# Patient Record
Sex: MALE | Race: WHITE | URBAN - METROPOLITAN AREA
[De-identification: names, ages, dates, MRNs, and addresses within clinical notes are randomized per-mention and may not be internally consistent; named-entity substitution may affect disease eponyms.]

---

## 2017-03-06 ENCOUNTER — OFFICE VISIT (OUTPATIENT)
Dept: DERMATOLOGY | Age: 67
End: 2017-03-06

## 2017-03-06 DIAGNOSIS — L57.0 AK (ACTINIC KERATOSIS): ICD-10-CM

## 2017-03-06 DIAGNOSIS — L82.1 SK (SEBORRHEIC KERATOSIS): Primary | ICD-10-CM

## 2017-03-06 PROCEDURE — 99213 OFFICE O/P EST LOW 20 MIN: CPT | Performed by: DERMATOLOGY

## 2017-10-31 ENCOUNTER — OFFICE VISIT (OUTPATIENT)
Dept: DERMATOLOGY | Age: 67
End: 2017-10-31

## 2017-10-31 DIAGNOSIS — L57.0 AK (ACTINIC KERATOSIS): Primary | ICD-10-CM

## 2017-10-31 DIAGNOSIS — L82.1 SK (SEBORRHEIC KERATOSIS): ICD-10-CM

## 2017-10-31 PROCEDURE — 99213 OFFICE O/P EST LOW 20 MIN: CPT | Performed by: DERMATOLOGY

## 2017-10-31 RX ORDER — FLUOROURACIL 50 MG/G
CREAM TOPICAL
Qty: 40 G | Refills: 0 | Status: SHIPPED | OUTPATIENT
Start: 2017-10-31 | End: 2020-11-05

## 2017-10-31 NOTE — PATIENT INSTRUCTIONS
Fluorouracil (Efudex / Carac)     Your physician has prescribed a topical medication that is used to treat pre-cancerous skin lesions and certain types of skin cancers. We are providing you with the following information so that you understand how the medication should be used and potential side effects you may experience.  Clean and dry the areas of the skin that will be treated.  Apply a small amount of the medication to your fingertip. Dab the medication onto the designated areas and rub it in.  Discontinue the medication once the skin starts to scab. Typically this takes between 2 and 4 weeks after starting the medication.  Avoid applying the medication in or around the eyes or eyelids. If the medication gets into your eyes, nose or mouth, rinse immediately.  Wash your hands immediately after application.  Side effects include: burning, redness, irritation, dryness, pain, swelling and changes in skin color. Vaseline and/or cool compresses may be applied to affected areas for comfort.  Please note that you may be more sensitive to the sun. Use sunscreen and wear protective clothing when outdoors. Avoid prolonged sun exposure. · Once you have discontinued the medication, apply vaseline several times daily until the skin has healed.

## 2020-11-05 ENCOUNTER — OFFICE VISIT (OUTPATIENT)
Dept: DERMATOLOGY | Age: 70
End: 2020-11-05
Payer: MEDICARE

## 2020-11-05 VITALS — TEMPERATURE: 97.4 F

## 2020-11-05 PROCEDURE — 99213 OFFICE O/P EST LOW 20 MIN: CPT | Performed by: DERMATOLOGY

## 2020-11-05 PROCEDURE — 17003 DESTRUCT PREMALG LES 2-14: CPT | Performed by: DERMATOLOGY

## 2020-11-05 PROCEDURE — 17000 DESTRUCT PREMALG LESION: CPT | Performed by: DERMATOLOGY

## 2020-11-05 PROCEDURE — 11200 RMVL SKIN TAGS UP TO&INC 15: CPT | Performed by: DERMATOLOGY

## 2020-11-05 RX ORDER — TRIAMCINOLONE ACETONIDE 1 MG/G
CREAM TOPICAL
Qty: 30 G | Refills: 1 | Status: SHIPPED | OUTPATIENT
Start: 2020-11-05

## 2020-11-05 NOTE — PROGRESS NOTES
[facility] Dermatology  Maria Esther Luong MD  [phone]      Alexander Peralta  1950    69 y.o. male     Date of Visit: 11/5/2020    Chief Complaint: skin lesions    History of Present Illness:    1. He complains of a couple of scaly lesions on the nose and left ear. 2.  He also complains of some recurrently irritated lesions on the left lower neck. 3.  He complains of a pruritic eruption behind the right knee. 4.  He also complains of a dark lesion on the left chest.    He has a history of multiple actinic keratoses of the forehead, temples and nose. Prescribed Efudex in the past.      Review of Systems:  Skin: No other concerning lesions or growths. Past Medical History, Family History, Surgical History, Medications and Allergies reviewed. Past Medical History:   Diagnosis Date    Hyperlipidemia     Hypertension      Past Surgical History:   Procedure Laterality Date    EYE SURGERY      detached retina       Allergies   Allergen Reactions    Codeine Nausea Only    Sulfur Hives     Outpatient Medications Marked as Taking for the 11/5/20 encounter (Office Visit) with Zeinab Simms MD   Medication Sig Dispense Refill    triamcinolone (KENALOG) 0.1 % cream Apply to affected area behind the right knee twice daily for up to 2 weeks or until improved. 30 g 1    atorvastatin (LIPITOR) 80 MG tablet Take 80 mg by mouth      Fexofenadine HCl (ALLEGRA PO) Take by mouth      aspirin 81 MG tablet Take 81 mg by mouth daily.  ramipril (ALTACE) 10 MG capsule Take 10 mg by mouth daily.  hydrochlorothiazide (HYDRODIURIL) 25 MG tablet Take 25 mg by mouth daily. Physical Examination       The following were examined and determined to be normal: Psych/Neuro, Scalp/hair, Conjunctivae/eyelids, Gums/teeth/lips, Breast/axilla/chest, Abdomen, Back, RUE, LUE, LLE and Nails/digits.     The following were examined and determined to be abnormal: Head/face, Breast/axilla/chest and RLE. Well-appearing. 1.  Left lower helix-1, right proximal nasal sidewall-1: 2 keratotic erythematous macules. 2.  Left lower lateral neck with 2 pedunculated brown papules. 3.  Right popliteal fossa with an ill-defined crusted and scaly erythematous patch. 4.  Left lower breast with a stuck on appearing waxy brown papule. Assessment and Plan     1. AK (actinic keratosis) -     2 cycles of liquid nitrogen applied to 2 AKs: left ear and nose. Patient was educated regarding the potential risks of blister formation, discomfort. Wound care was discussed. 2. Cutaneous skin tags - recurrently irritated due to location    2 cycles of liquid nitrogen applied to 2 skin tags on the left lower neck. Patient was educated regarding the potential risks of blister formation, discomfort. Wound care was discussed. 3. Irritant dermatitis behind the right knee    Triamcinolone 0.1% cream twice daily for up to 2 weeks or until improved. 4. SK (seborrheic keratosis)     Reassurance. Return in about 1 year (around 11/5/2021).     --Nai Nance MD

## 2021-11-08 ENCOUNTER — OFFICE VISIT (OUTPATIENT)
Dept: DERMATOLOGY | Age: 71
End: 2021-11-08
Payer: MEDICARE

## 2021-11-08 VITALS — TEMPERATURE: 98.1 F

## 2021-11-08 DIAGNOSIS — L82.0 INFLAMED SEBORRHEIC KERATOSIS: ICD-10-CM

## 2021-11-08 DIAGNOSIS — L57.0 ACTINIC KERATOSIS: Primary | ICD-10-CM

## 2021-11-08 PROCEDURE — 17000 DESTRUCT PREMALG LESION: CPT | Performed by: DERMATOLOGY

## 2021-11-08 PROCEDURE — 17003 DESTRUCT PREMALG LES 2-14: CPT | Performed by: DERMATOLOGY

## 2021-11-08 PROCEDURE — 17110 DESTRUCTION B9 LES UP TO 14: CPT | Performed by: DERMATOLOGY

## 2021-11-08 NOTE — PROGRESS NOTES
[facility] Dermatology  [facility] Johnna Jo  1950    66 y.o. male     Date of Visit: 11/8/2021    Chief Complaint: skin lesions    History of Present Illness:    1. Follow up for a hx of AKs - has several new lesions on forehead and upper extremities. 2.  He also has an irritated lesion on the central upper back. Derm Hx:  He has a history of facial actinic keratoses that were treated with Efudex in the past.      Review of Systems:  Gen: Feels well, good sense of health. Past Medical History, Family History, Surgical History, Medications and Allergies reviewed. Past Medical History:   Diagnosis Date    Hyperlipidemia     Hypertension      Past Surgical History:   Procedure Laterality Date    EYE SURGERY      detached retina       Allergies   Allergen Reactions    Codeine Nausea Only    Sulfur Hives     Outpatient Medications Marked as Taking for the 11/8/21 encounter (Office Visit) with Jg Cronin MD   Medication Sig Dispense Refill    Nutritional Supplements (VITAMIN D BOOSTER PO) Take by mouth      triamcinolone (KENALOG) 0.1 % cream Apply to affected area behind the right knee twice daily for up to 2 weeks or until improved. 30 g 1    atorvastatin (LIPITOR) 80 MG tablet Take 80 mg by mouth      Fexofenadine HCl (ALLEGRA PO) Take by mouth      aspirin 81 MG tablet Take 81 mg by mouth daily.  ramipril (ALTACE) 10 MG capsule Take 10 mg by mouth daily.  hydrochlorothiazide (HYDRODIURIL) 25 MG tablet Take 25 mg by mouth daily. Social History:  Occupation:        Physical Examination       The following were examined and determined to be normal: Psych/Neuro, Scalp/hair, Conjunctivae/eyelids, Gums/teeth/lips, Neck, Breast/axilla/chest, Abdomen, RLE, LLE and Nails/digits. The following were examined and determined to be abnormal: Head/face, Back, RUE and LUE. Well appearing.     1.  Right hand - 2, right forearm - 2, left lower arm - 1, left forearm - 4, right lateral forehead - 2: ill defined keratotic pink macules. 2.  Central upper back - stuck on appearing round verrucous pink plaque with focal crusting. Assessment and Plan     1. Actinic keratoses - several    2 cycles of liquid nitrogen applied to 11 AKs: Right hand - 2, right forearm - 2, left lower arm - 1, left forearm - 4, right lateral forehead - 2. Patient was educated regarding the potential risks of blister formation and discomfort. Wound care was discussed. 2. Inflamed seborrheic keratosis - 1    2 cycles of liquid nitrogen applied to 1 ISK on the central upper back. Patient was educated regarding the potential risks of blister formation and discomfort. Wound care was discussed. Return in about 1 year (around 11/8/2022).     --Maria Del Caremn Jaimes MD

## 2022-11-09 ENCOUNTER — OFFICE VISIT (OUTPATIENT)
Dept: DERMATOLOGY | Age: 72
End: 2022-11-09
Payer: MEDICARE

## 2022-11-09 DIAGNOSIS — L82.1 SK (SEBORRHEIC KERATOSIS): ICD-10-CM

## 2022-11-09 DIAGNOSIS — L57.0 ACTINIC KERATOSIS: Primary | ICD-10-CM

## 2022-11-09 DIAGNOSIS — L81.4 SOLAR LENTIGINOSIS: ICD-10-CM

## 2022-11-09 PROCEDURE — 17003 DESTRUCT PREMALG LES 2-14: CPT | Performed by: DERMATOLOGY

## 2022-11-09 PROCEDURE — 1123F ACP DISCUSS/DSCN MKR DOCD: CPT | Performed by: DERMATOLOGY

## 2022-11-09 PROCEDURE — 17000 DESTRUCT PREMALG LESION: CPT | Performed by: DERMATOLOGY

## 2022-11-09 PROCEDURE — 99213 OFFICE O/P EST LOW 20 MIN: CPT | Performed by: DERMATOLOGY

## 2022-11-09 RX ORDER — ZINC GLUCONATE 50 MG
50 TABLET ORAL DAILY
COMMUNITY

## 2022-11-09 NOTE — PROGRESS NOTES
[address] [place] [address] Dermatology  Johnna Orellana  1950    67 y.o. male     Date of Visit: 11/9/2022    Chief Complaint: skin lesions    History of Present Illness:    1. He reports a couple of persistent scaly lesions on the right forehead/temple. 2.  He has stable pigmented lesions on the extremities. 3.  He has asymptomatic growths on the trunk. Derm Hx:  He has a history of facial actinic keratoses that were treated with Efudex in the past.    Review of Systems:  Gen: Feels well, good sense of health. Past Medical History, Family History, Surgical History, Medications and Allergies reviewed. Past Medical History:   Diagnosis Date    Hyperlipidemia     Hypertension      Past Surgical History:   Procedure Laterality Date    EYE SURGERY      detached retina       Allergies   Allergen Reactions    Codeine Nausea Only    Elemental Sulfur Hives     Outpatient Medications Marked as Taking for the 11/9/22 encounter (Office Visit) with Raphael Brito MD   Medication Sig Dispense Refill    zinc gluconate 50 MG tablet Take 50 mg by mouth daily      triamcinolone (KENALOG) 0.1 % cream Apply to affected area behind the right knee twice daily for up to 2 weeks or until improved. 30 g 1    atorvastatin (LIPITOR) 80 MG tablet Take 80 mg by mouth      Fexofenadine HCl (ALLEGRA PO) Take by mouth      aspirin 81 MG tablet Take 81 mg by mouth daily. ramipril (ALTACE) 10 MG capsule Take 10 mg by mouth daily. hydrochlorothiazide (HYDRODIURIL) 25 MG tablet Take 25 mg by mouth daily. Social History:  Occupation:        Physical Examination       The following were examined and determined to be normal: Psych/Neuro, Scalp/hair, Conjunctivae/eyelids, Gums/teeth/lips, Neck, Breast/axilla/chest, Abdomen, Back, RUE, LUE, RLE, LLE, and Nails/digits. The following were examined and determined to be abnormal: Head/face. Well appearing.     1. Right temple/forehead - 2 scaly pink macules. 2.  Extremities with scattered smooth brown macules and patches. 3.  Trunk with stuck-on appearing tan-brown verrucous papules and plaques. Assessment and Plan     1. Actinic keratoses - 2    Cryotherapy was discussed and patient agreed to proceed. Consent was obtained. 2 lesions were treated cryotherapy: right lateral forehead/temple. 2 cycles of liquid nitrogen applied to each lesion for 5 seconds using a Cry-Ac cryo spray gun. Patient was educated regarding the potential risks of blister formation and discomfort. Wound care was discussed. The patient tolerated the procedure well and there were no immediate complications. 2. Solar lentiginosis     Monitor for change. Sun protective behaviors encouraged including use of at least SPF 30 or more sunscreen. 3. SK (seborrheic keratosis)     Reassurance. Return in about 1 year (around 11/9/2023).     --Cayla Grajeda MD